# Patient Record
Sex: FEMALE | Race: WHITE | NOT HISPANIC OR LATINO | Employment: UNEMPLOYED | URBAN - METROPOLITAN AREA
[De-identification: names, ages, dates, MRNs, and addresses within clinical notes are randomized per-mention and may not be internally consistent; named-entity substitution may affect disease eponyms.]

---

## 2023-06-19 ENCOUNTER — APPOINTMENT (OUTPATIENT)
Dept: PRIMARY CARE | Facility: CLINIC | Age: 48
End: 2023-06-19
Payer: COMMERCIAL

## 2023-09-07 ENCOUNTER — OFFICE VISIT (OUTPATIENT)
Dept: PRIMARY CARE | Facility: CLINIC | Age: 48
End: 2023-09-07
Payer: COMMERCIAL

## 2023-09-07 VITALS
WEIGHT: 185 LBS | HEIGHT: 64 IN | DIASTOLIC BLOOD PRESSURE: 78 MMHG | SYSTOLIC BLOOD PRESSURE: 126 MMHG | BODY MASS INDEX: 31.58 KG/M2

## 2023-09-07 DIAGNOSIS — R35.0 URINARY FREQUENCY: ICD-10-CM

## 2023-09-07 PROCEDURE — 99213 OFFICE O/P EST LOW 20 MIN: CPT | Performed by: INTERNAL MEDICINE

## 2023-09-07 RX ORDER — NITROFURANTOIN 25; 75 MG/1; MG/1
100 CAPSULE ORAL 2 TIMES DAILY
Qty: 20 CAPSULE | Refills: 0 | Status: SHIPPED | OUTPATIENT
Start: 2023-09-07 | End: 2023-09-17

## 2023-09-07 ASSESSMENT — ENCOUNTER SYMPTOMS
DEPRESSION: 0
LOSS OF SENSATION IN FEET: 0
OCCASIONAL FEELINGS OF UNSTEADINESS: 0

## 2023-09-07 NOTE — PROGRESS NOTES
"OFFICE NOTE    NAME OF THE PATIENT: Liz Capone    YOB: 1975    CHIEF COMPLAINT:  Liz today came here for increased frequency, lower abdominal pressure, UTI symptoms.  She is going through change in life.  She wants to get tested.    PAST MEDICAL HISTORY:  Reviewed on EMR, unchanged.  Perimenopausal.    CURRENT MEDICATIONS:  Reviewed on EMR, unchanged.  Over-the-counter progesterone.    ALLERGIES:  Reviewed on EMR, unchanged.    SOCIAL HISTORY:  Reviewed on EMR, unchanged.  She does not smoke, does not drink alcohol.    FAMILY HISTORY:  Reviewed on EMR, unchanged.    REVIEW OF SYSTEMS:  All 12 systems reviewed and pertaining covered in history and physical.    PHYSICAL EXAMINATION  VITAL SIGNS:  As recorded and reviewed from EMR.  RESPIRATORY:  The patient had normal inspirations and expirations.  The breath sounds were equal bilaterally and clear to auscultation.  CARDIOVASCULAR:  The patient had S1 normal, split S2 without obvious rubs, clicks, or murmurs.    GASTROINTESTINAL:  There was no hepatosplenomegaly.  There were no palpable masses and no inguinal nodes.  EXTREMITIES:  Legs had no edema.  NEUROLOGIC:  The patient had normal cranial nerves.  The reflexes, sensory, and motor examination were grossly within normal limits.    LAB WORK:  UA ordered.    ASSESSMENT AND PLAN:  Increased frequency, UTI symptoms.  Macrobid given.  Perimenopausal.  We will check hormone level.  Weight gain ______.  She does not think she snores.  She does not think she needs sleep apnea test.  Blood work ordered.  I shall see her back in a week after the testing.  Continue to follow.    Kindly review this note in conjunction with EMR.   Subjective   Patient ID: Liz Capone is a 47 y.o. female who presents for Follow-up.      HPI    Review of Systems    Objective   /78   Ht 1.626 m (5' 4\")   Wt 83.9 kg (185 lb)   BMI 31.76 kg/m²       Physical Exam    Assessment/Plan   Problem List Items " Addressed This Visit    None

## 2023-09-08 ENCOUNTER — LAB (OUTPATIENT)
Dept: LAB | Facility: LAB | Age: 48
End: 2023-09-08
Payer: COMMERCIAL

## 2023-09-08 DIAGNOSIS — R35.0 URINARY FREQUENCY: ICD-10-CM

## 2023-09-08 LAB
APPEARANCE, URINE: CLEAR
BILIRUBIN, URINE: NEGATIVE
BLOOD, URINE: ABNORMAL
COLOR, URINE: ABNORMAL
GLUCOSE, URINE: NEGATIVE MG/DL
KETONES, URINE: NEGATIVE MG/DL
LEUKOCYTE ESTERASE, URINE: ABNORMAL
NITRITE, URINE: POSITIVE
PH, URINE: 6 (ref 5–8)
PROTEIN, URINE: NEGATIVE MG/DL
RBC, URINE: 2 /HPF (ref 0–5)
SPECIFIC GRAVITY, URINE: 1.02 (ref 1–1.03)
SQUAMOUS EPITHELIAL CELLS, URINE: 6 /HPF
UROBILINOGEN, URINE: 2 MG/DL (ref 0–1.9)
WBC, URINE: 6 /HPF (ref 0–5)

## 2023-09-08 PROCEDURE — 87086 URINE CULTURE/COLONY COUNT: CPT

## 2023-09-08 PROCEDURE — 81001 URINALYSIS AUTO W/SCOPE: CPT

## 2023-09-09 LAB — URINE CULTURE: NORMAL

## 2023-09-11 DIAGNOSIS — N39.0 URINARY TRACT INFECTION WITHOUT HEMATURIA, SITE UNSPECIFIED: ICD-10-CM

## 2023-09-11 DIAGNOSIS — R35.0 URINARY FREQUENCY: ICD-10-CM

## 2023-09-11 RX ORDER — SULFAMETHOXAZOLE AND TRIMETHOPRIM 800; 160 MG/1; MG/1
1 TABLET ORAL 2 TIMES DAILY
Qty: 20 TABLET | Refills: 0 | Status: SHIPPED | OUTPATIENT
Start: 2023-09-11 | End: 2023-09-21

## 2023-09-12 RX ORDER — CIPROFLOXACIN 500 MG/1
500 TABLET ORAL 2 TIMES DAILY
Qty: 20 TABLET | Refills: 0 | Status: SHIPPED | OUTPATIENT
Start: 2023-09-12 | End: 2023-09-22

## 2023-09-25 PROBLEM — R35.0 FREQUENCY OF MICTURITION: Status: ACTIVE | Noted: 2023-09-25

## 2023-09-25 PROBLEM — U07.1 COVID-19: Status: ACTIVE | Noted: 2020-12-29

## 2023-09-25 PROBLEM — N76.0 VULVOVAGINITIS: Status: ACTIVE | Noted: 2020-08-19

## 2023-09-25 PROBLEM — R30.0 BURNING WITH URINATION: Status: ACTIVE | Noted: 2021-06-15

## 2023-09-25 PROBLEM — R53.82 CHRONIC FATIGUE: Status: ACTIVE | Noted: 2023-09-25

## 2023-09-25 PROBLEM — R82.90 ABNORMAL URINE FINDING: Status: ACTIVE | Noted: 2019-04-25

## 2023-09-25 PROBLEM — M62.00 DIASTASIS OF MUSCLE: Status: ACTIVE | Noted: 2023-09-25

## 2023-09-25 PROBLEM — M54.50 LUMBAGO: Status: ACTIVE | Noted: 2023-09-25

## 2023-09-25 PROBLEM — R42 DIZZINESS: Status: ACTIVE | Noted: 2020-01-27

## 2023-09-25 PROBLEM — N92.6 IRREGULAR MENSES: Status: ACTIVE | Noted: 2021-04-22

## 2023-09-25 PROBLEM — F41.9 ANXIETY: Status: ACTIVE | Noted: 2023-09-25

## 2023-09-25 PROBLEM — R39.15 URINARY URGENCY: Status: ACTIVE | Noted: 2019-04-25

## 2023-09-25 PROBLEM — R20.2 PARESTHESIA OF SKIN: Status: ACTIVE | Noted: 2023-09-25

## 2023-09-25 PROBLEM — M54.2 CERVICALGIA: Status: ACTIVE | Noted: 2023-09-25

## 2023-09-25 PROBLEM — K21.9 ACID REFLUX: Status: ACTIVE | Noted: 2023-09-25

## 2023-09-25 PROBLEM — M54.30 SCIATIC NEURITIS: Status: ACTIVE | Noted: 2023-09-25

## 2023-09-25 PROBLEM — B37.31 VAGINITIS DUE TO CANDIDA ALBICANS: Status: ACTIVE | Noted: 2023-09-25

## 2023-09-25 PROBLEM — R79.89 LOW VITAMIN D LEVEL: Status: ACTIVE | Noted: 2021-04-22

## 2023-09-25 PROBLEM — N92.0 MENORRHAGIA WITH REGULAR CYCLE: Status: ACTIVE | Noted: 2023-09-25

## 2023-09-25 PROBLEM — H74.8X3: Status: ACTIVE | Noted: 2020-01-27

## 2023-09-25 PROBLEM — B35.4 TINEA CORPORIS: Status: ACTIVE | Noted: 2019-08-03

## 2023-09-25 RX ORDER — MUPIROCIN 20 MG/G
OINTMENT TOPICAL
COMMUNITY
Start: 2016-08-12

## 2023-09-25 RX ORDER — IBUPROFEN 800 MG/1
800 TABLET ORAL EVERY 8 HOURS PRN
COMMUNITY

## 2023-09-25 RX ORDER — ZINC GLUCONATE 50 MG
1 TABLET ORAL DAILY
COMMUNITY

## 2023-09-25 RX ORDER — LORATADINE PSEUDOEPHEDRINE SULFATE 10; 240 MG/1; MG/1
1 TABLET, EXTENDED RELEASE ORAL DAILY
COMMUNITY
Start: 2022-08-16

## 2023-09-25 RX ORDER — PANTOPRAZOLE SODIUM 40 MG/1
40 TABLET, DELAYED RELEASE ORAL DAILY
COMMUNITY
Start: 2022-02-15

## 2023-09-25 RX ORDER — ERGOCALCIFEROL (VITAMIN D2) 50 MCG
1 CAPSULE ORAL DAILY
COMMUNITY

## 2023-09-25 RX ORDER — FLUTICASONE PROPIONATE 50 MCG
SPRAY, SUSPENSION (ML) NASAL
COMMUNITY

## 2023-09-25 RX ORDER — HYDROCODONE BITARTRATE AND ACETAMINOPHEN 5; 325 MG/1; MG/1
1 TABLET ORAL EVERY 4 HOURS PRN
COMMUNITY

## 2023-10-03 ENCOUNTER — TELEPHONE (OUTPATIENT)
Dept: OBSTETRICS AND GYNECOLOGY | Facility: CLINIC | Age: 48
End: 2023-10-03
Payer: COMMERCIAL

## 2023-10-03 DIAGNOSIS — Z12.31 SCREENING MAMMOGRAM FOR BREAST CANCER: Primary | ICD-10-CM

## 2023-10-03 NOTE — TELEPHONE ENCOUNTER
Patient wants to know if she can have labs ordered before her appointment so that she can have her labs there done at the appointment,also if it can an annual exam that you can do everything at once instead of her coming back and forth,please advise

## 2023-10-06 ENCOUNTER — APPOINTMENT (OUTPATIENT)
Dept: OBSTETRICS AND GYNECOLOGY | Facility: CLINIC | Age: 48
End: 2023-10-06
Payer: COMMERCIAL

## 2023-12-28 DIAGNOSIS — R35.0 URINARY FREQUENCY: ICD-10-CM

## 2023-12-29 RX ORDER — CIPROFLOXACIN 500 MG/1
TABLET ORAL
Qty: 20 TABLET | Refills: 0 | OUTPATIENT
Start: 2023-12-29

## 2024-11-05 ENCOUNTER — APPOINTMENT (OUTPATIENT)
Dept: RADIOLOGY | Facility: CLINIC | Age: 49
End: 2024-11-05
Payer: COMMERCIAL

## 2024-12-09 ENCOUNTER — OFFICE VISIT (OUTPATIENT)
Dept: ORTHOPEDIC SURGERY | Facility: HOSPITAL | Age: 49
End: 2024-12-09
Payer: COMMERCIAL

## 2024-12-09 ENCOUNTER — HOSPITAL ENCOUNTER (OUTPATIENT)
Dept: RADIOLOGY | Facility: HOSPITAL | Age: 49
Discharge: HOME | End: 2024-12-09
Payer: COMMERCIAL

## 2024-12-09 DIAGNOSIS — S52.602A CLOSED FRACTURE OF LEFT DISTAL RADIUS AND ULNA, INITIAL ENCOUNTER: ICD-10-CM

## 2024-12-09 DIAGNOSIS — S52.502A CLOSED FRACTURE OF LEFT DISTAL RADIUS AND ULNA, INITIAL ENCOUNTER: ICD-10-CM

## 2024-12-09 DIAGNOSIS — M25.532 LEFT WRIST PAIN: Primary | ICD-10-CM

## 2024-12-09 DIAGNOSIS — M25.532 LEFT WRIST PAIN: ICD-10-CM

## 2024-12-09 PROCEDURE — 99203 OFFICE O/P NEW LOW 30 MIN: CPT | Performed by: STUDENT IN AN ORGANIZED HEALTH CARE EDUCATION/TRAINING PROGRAM

## 2024-12-09 PROCEDURE — 1036F TOBACCO NON-USER: CPT | Performed by: STUDENT IN AN ORGANIZED HEALTH CARE EDUCATION/TRAINING PROGRAM

## 2024-12-09 PROCEDURE — 99213 OFFICE O/P EST LOW 20 MIN: CPT | Performed by: STUDENT IN AN ORGANIZED HEALTH CARE EDUCATION/TRAINING PROGRAM

## 2024-12-09 RX ORDER — OXYCODONE AND ACETAMINOPHEN 5; 325 MG/1; MG/1
1 TABLET ORAL EVERY 6 HOURS PRN
Qty: 12 TABLET | Refills: 0 | Status: SHIPPED | OUTPATIENT
Start: 2024-12-09 | End: 2024-12-13

## 2024-12-09 ASSESSMENT — PAIN SCALES - GENERAL: PAINLEVEL_OUTOF10: 9

## 2024-12-09 ASSESSMENT — PAIN - FUNCTIONAL ASSESSMENT: PAIN_FUNCTIONAL_ASSESSMENT: 0-10

## 2024-12-09 NOTE — PROGRESS NOTES
History of Present Illness   Patient presents today for evaluation of side: left upper extremity pain.    The patient sustained an acute injury on  12/7/2024 .  The patient was celebrating her 49st birthday when she fell onto an outstretched hand.  She went and had immediate pain to her wrist and noticed swelling.  She went to an urgent care had x-rays performed which demonstrated a comminuted intra-articular left distal radius fracture.  She was given a splint.  The patient denies any loss of consciousness or additional significant injuries.  The patient denies any current numbness or tingling.  The pain is sharp, acute in nature, better with rest worse with motion.    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  She currently rates her pain 9 out of 10.     Past Medical History:   Diagnosis Date    Personal history of other diseases of the respiratory system 08/19/2013    Personal history of acute sinusitis       Medication Documentation Review Audit       Reviewed by Coty Richardson LPN (Licensed Nurse) on 12/09/24 at 1047      Medication Order Taking? Sig Documenting Provider Last Dose Status   DOCOSAHEXAENOIC ACID ORAL [id number]  Take 1 tablet by mouth once daily. Historical Provider, MD  Active   ergocalciferol (Vitamin D-2) 50 MCG (2000 UT) capsule capsule [id number]  Take 1 capsule (50 mcg) by mouth once daily. Historical Provider, MD  Active   fluticasone (Flonase) 50 mcg/actuation nasal spray [id number]  SPRAY 1 SPRAY INTO EACH NOSTRIL EVERY DAY FOR 30 DAYS Nasally Twice a day Historical Provider, MD  Active   HYDROcodone-acetaminophen (Norco) 5-325 mg tablet [id number]  Take 1 tablet by mouth every 4 hours if needed (pain). While awake Historical Provider, MD  Active   ibuprofen 800 mg tablet [id number]  Take 1 tablet (800 mg) by mouth every 8 hours if needed for mild pain (1 - 3). Historical Provider, MD  Active   loratadine-pseudoephedrine (Claritin-D 24 Hour)   mg 24 hr tablet [id number]  Take 1 tablet by mouth once daily. Historical Provider, MD  Active   mupirocin (Bactroban) 2 % ointment [id number]  APPLY A SMALL AMOUNT 3 TIMES DAILY AS DIRECTED. Historical Provider, MD  Active   pantoprazole (ProtoNix) 40 mg EC tablet [id number]  Take 1 tablet (40 mg) by mouth once daily. Historical Provider, MD  Active   zinc gluconate 50 mg tablet [id number]  Take 1 tablet (50 mg) by mouth once daily. Historical Provider, MD  Active                    Allergies   Allergen Reactions    Sulfamethoxazole-Trimethoprim Other    Amoxicillin-Pot Clavulanate Other    Levofloxacin Other    Sulfa (Sulfonamide Antibiotics) Chills       Social History     Socioeconomic History    Marital status:      Spouse name: Not on file    Number of children: Not on file    Years of education: Not on file    Highest education level: Not on file   Occupational History    Not on file   Tobacco Use    Smoking status: Never    Smokeless tobacco: Never   Substance and Sexual Activity    Alcohol use: Not on file    Drug use: Not on file    Sexual activity: Not on file   Other Topics Concern    Not on file   Social History Narrative    Not on file     Social Drivers of Health     Financial Resource Strain: Not on file   Food Insecurity: Not on file   Transportation Needs: Not on file   Physical Activity: Not on file   Stress: Not on file   Social Connections: Not on file   Intimate Partner Violence: Not on file   Housing Stability: Not on file       No past surgical history on file.       Review of Systems   GENERAL: Negative  GI: Negative  MUSCULOSKELETAL: See HPI  SKIN: Negative  NEURO:  Negative     Physical Exam:  side: left upper extremity:  Patient is in a volar and dorsal short arm Ortho-Glass splint with Coban present  Skin healthy to gross inspection, no breakdown  Significant swelling / ecchymosis noted  Tender to palpation to the distal radius.  Intact flexion and extension of 1st IP joint and  finger abduction  Sensation intact to light touch medial / ulnar and radial nerve distribution   Good cap refill     Imaging  Outside images were reviewed in office today.  These have been uploaded onto the patient's name.  XR of the left wrist dated 12/7/2024 demonstrate a comminuted intra-articular distal radius with metaphyseal extension with near anatomic alignment     Assessment   Patient with an acute side: left intra-articular distal radius fracture      Plan:  We discussed that she has a highly comminuted distal radius fracture with metaphyseal extension.  Even though she has a comminuted distal radius fracture her alignment is near anatomic.  At this point we will transition her into a plaster splint as her last splint was not providing her with enough support.  I am concerned that her alignment will not maintained in this position and therefore we will watch her closely.  I will see her back in 1 week with repeat x-rays.  If her alignment is maintained and her swelling significantly decreases then we will transition her to a cast.  If her fracture displaces then we will talk about surgical fixation.     Follow-up 1 week with repeat x-rays and splint

## 2024-12-13 DIAGNOSIS — S52.502A CLOSED FRACTURE OF LEFT DISTAL RADIUS AND ULNA, INITIAL ENCOUNTER: ICD-10-CM

## 2024-12-13 DIAGNOSIS — S52.602A CLOSED FRACTURE OF LEFT DISTAL RADIUS AND ULNA, INITIAL ENCOUNTER: ICD-10-CM

## 2024-12-13 RX ORDER — OXYCODONE AND ACETAMINOPHEN 5; 325 MG/1; MG/1
1 TABLET ORAL EVERY 6 HOURS PRN
Qty: 12 TABLET | Refills: 0 | Status: SHIPPED | OUTPATIENT
Start: 2024-12-13 | End: 2024-12-20

## 2024-12-16 ENCOUNTER — OFFICE VISIT (OUTPATIENT)
Dept: ORTHOPEDIC SURGERY | Facility: HOSPITAL | Age: 49
End: 2024-12-16
Payer: COMMERCIAL

## 2024-12-16 ENCOUNTER — HOSPITAL ENCOUNTER (OUTPATIENT)
Dept: RADIOLOGY | Facility: HOSPITAL | Age: 49
Discharge: HOME | End: 2024-12-16
Payer: COMMERCIAL

## 2024-12-16 DIAGNOSIS — M25.532 LEFT WRIST PAIN: Primary | ICD-10-CM

## 2024-12-16 DIAGNOSIS — M25.532 LEFT WRIST PAIN: ICD-10-CM

## 2024-12-16 PROCEDURE — 73110 X-RAY EXAM OF WRIST: CPT | Mod: LEFT SIDE | Performed by: RADIOLOGY

## 2024-12-16 PROCEDURE — 73110 X-RAY EXAM OF WRIST: CPT | Mod: LT

## 2024-12-16 PROCEDURE — 99212 OFFICE O/P EST SF 10 MIN: CPT | Performed by: STUDENT IN AN ORGANIZED HEALTH CARE EDUCATION/TRAINING PROGRAM

## 2024-12-16 ASSESSMENT — PAIN - FUNCTIONAL ASSESSMENT: PAIN_FUNCTIONAL_ASSESSMENT: 0-10

## 2024-12-16 ASSESSMENT — PAIN SCALES - GENERAL: PAINLEVEL_OUTOF10: 3

## 2024-12-16 NOTE — PROGRESS NOTES
History of Present Illness   Patient presents today for evaluation of side: left upper extremity pain.    The patient sustained an acute injury on  12/7/2024 .  The patient was celebrating her 49st birthday when she fell onto an outstretched hand.  She went and had immediate pain to her wrist and noticed swelling.  She went to an urgent care had x-rays performed which demonstrated a comminuted intra-articular left distal radius fracture.  She was given a splint.  The patient denies any loss of consciousness or additional significant injuries.  The patient denies any current numbness or tingling.  The pain is sharp, acute in nature, better with rest worse with motion.    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  She currently rates her pain 9 out of 10.    12/16/2024 follow-up:  Patient presents for follow-up.  She has remained in her splint since last visit.  She continues to have pain however it is improving.     Past Medical History:   Diagnosis Date    Personal history of other diseases of the respiratory system 08/19/2013    Personal history of acute sinusitis       Medication Documentation Review Audit       Reviewed by Coty Richardson LPN (Licensed Nurse) on 12/12/24 at 1324      Medication Order Taking? Sig Documenting Provider Last Dose Status   DOCOSAHEXAENOIC ACID ORAL [id number]  Take 1 tablet by mouth once daily. Historical Provider, MD  Active   ergocalciferol (Vitamin D-2) 50 MCG (2000 UT) capsule capsule [id number]  Take 1 capsule (50 mcg) by mouth once daily. Historical Provider, MD  Active   fluticasone (Flonase) 50 mcg/actuation nasal spray [id number]  SPRAY 1 SPRAY INTO EACH NOSTRIL EVERY DAY FOR 30 DAYS Nasally Twice a day Historical Provider, MD  Active   HYDROcodone-acetaminophen (Norco) 5-325 mg tablet [id number]  Take 1 tablet by mouth every 4 hours if needed (pain). While awake Historical Provider, MD  Active   ibuprofen 800 mg tablet [id number]   Take 1 tablet (800 mg) by mouth every 8 hours if needed for mild pain (1 - 3). Historical Provider, MD  Active   loratadine-pseudoephedrine (Claritin-D 24 Hour)  mg 24 hr tablet [id number]  Take 1 tablet by mouth once daily. Historical Provider, MD  Active   mupirocin (Bactroban) 2 % ointment [id number]  APPLY A SMALL AMOUNT 3 TIMES DAILY AS DIRECTED. Historical Provider, MD  Active   oxyCODONE-acetaminophen (Percocet) 5-325 mg tablet [id number]  Take 1 tablet by mouth every 6 hours if needed for severe pain (7 - 10) for up to 7 days. Will B Beucler, DO  Active   pantoprazole (ProtoNix) 40 mg EC tablet [id number]  Take 1 tablet (40 mg) by mouth once daily. Historical Provider, MD  Active   zinc gluconate 50 mg tablet [id number]  Take 1 tablet (50 mg) by mouth once daily. Historical Provider, MD  Active                    Allergies   Allergen Reactions    Sulfamethoxazole-Trimethoprim Other    Amoxicillin-Pot Clavulanate Other    Levofloxacin Other    Sulfa (Sulfonamide Antibiotics) Chills       Social History     Socioeconomic History    Marital status:      Spouse name: Not on file    Number of children: Not on file    Years of education: Not on file    Highest education level: Not on file   Occupational History    Not on file   Tobacco Use    Smoking status: Never    Smokeless tobacco: Never   Substance and Sexual Activity    Alcohol use: Not on file    Drug use: Not on file    Sexual activity: Not on file   Other Topics Concern    Not on file   Social History Narrative    Not on file     Social Drivers of Health     Financial Resource Strain: Not on file   Food Insecurity: Not on file   Transportation Needs: Not on file   Physical Activity: Not on file   Stress: Not on file   Social Connections: Not on file   Intimate Partner Violence: Not on file   Housing Stability: Not on file       No past surgical history on file.       Review of Systems   GENERAL: Negative  GI: Negative  MUSCULOSKELETAL: See  HPI  SKIN: Negative  NEURO:  Negative     Physical Exam:  side: left upper extremity:  Patient doing well fitting plaster splint.   Visible skin healthy to gross inspection, no breakdown  Moderate swelling / ecchymosis noted  Intact flexion and extension of 1st IP joint and finger abduction  Sensation intact to light touch medial / ulnar and radial nerve distribution   Good cap refill     Imaging  X-rays of the left wrist were taken and reviewed in office today.  These were compared to outside images were reviewed in office today.  These have been uploaded onto the patient's name.  XR of the left wrist dated 12/7/2024 demonstrate a comminuted intra-articular distal radius with metaphyseal extension with near anatomic alignment.  No significant change in alignment     Assessment   Patient with an acute side: left intra-articular distal radius fracture      Plan:  We discussed that she has a highly comminuted distal radius fracture with metaphyseal extension.  Even though she has a comminuted distal radius fracture her alignment is near anatomic.  We will keep her hand in her splint.  She will continue to elevate.  Will see her back in 1 week with repeat x-rays and transition to a short arm cast.  If her alignment holds at that point we can space out the appointments y.  I will see her back in 1 week with repeat x-rays.  If her fracture displaces then we will talk about surgical fixation.     Follow-up 1 week with repeat x-rays and splint

## 2024-12-23 ENCOUNTER — OFFICE VISIT (OUTPATIENT)
Dept: ORTHOPEDIC SURGERY | Facility: HOSPITAL | Age: 49
End: 2024-12-23
Payer: COMMERCIAL

## 2024-12-23 ENCOUNTER — HOSPITAL ENCOUNTER (OUTPATIENT)
Dept: RADIOLOGY | Facility: HOSPITAL | Age: 49
Discharge: HOME | End: 2024-12-23
Payer: COMMERCIAL

## 2024-12-23 DIAGNOSIS — M25.532 LEFT WRIST PAIN: Primary | ICD-10-CM

## 2024-12-23 DIAGNOSIS — M25.532 LEFT WRIST PAIN: ICD-10-CM

## 2024-12-23 PROCEDURE — 99213 OFFICE O/P EST LOW 20 MIN: CPT | Performed by: STUDENT IN AN ORGANIZED HEALTH CARE EDUCATION/TRAINING PROGRAM

## 2024-12-23 PROCEDURE — 73110 X-RAY EXAM OF WRIST: CPT | Mod: LT

## 2024-12-23 PROCEDURE — 73110 X-RAY EXAM OF WRIST: CPT | Mod: LEFT SIDE | Performed by: RADIOLOGY

## 2024-12-23 NOTE — PROGRESS NOTES
History of Present Illness   Patient presents today for evaluation of side: left upper extremity pain.    The patient sustained an acute injury on  12/7/2024 .  The patient was celebrating her 49st birthday when she fell onto an outstretched hand.  She went and had immediate pain to her wrist and noticed swelling.  She went to an urgent care had x-rays performed which demonstrated a comminuted intra-articular left distal radius fracture.  She was given a splint.  The patient denies any loss of consciousness or additional significant injuries.  The patient denies any current numbness or tingling.  The pain is sharp, acute in nature, better with rest worse with motion.    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  She currently rates her pain 9 out of 10.    12/16/2024 follow-up:  Patient presents for follow-up.  She has remained in her splint since last visit.  She continues to have pain however it is improving.    12/23/2024 follow-up:  Patient presents for follow-up.  She has remained in her splint.  She continues to follow-up for alignment check and possible transition to a cast.  She notes continued taking anti-inflammatory medication such as ibuprofen however her pain is lessening.  She was seen with her family present.     Past Medical History:   Diagnosis Date    Personal history of other diseases of the respiratory system 08/19/2013    Personal history of acute sinusitis       Medication Documentation Review Audit       Reviewed by Coty Richardson LPN (Licensed Nurse) on 12/16/24 at 0837      Medication Order Taking? Sig Documenting Provider Last Dose Status   DOCOSAHEXAENOIC ACID ORAL [id number]  Take 1 tablet by mouth once daily. Historical Provider, MD  Active   ergocalciferol (Vitamin D-2) 50 MCG (2000 UT) capsule capsule [id number]  Take 1 capsule (50 mcg) by mouth once daily. Historical Provider, MD  Active   fluticasone (Flonase) 50 mcg/actuation nasal spray  [id number]  SPRAY 1 SPRAY INTO EACH NOSTRIL EVERY DAY FOR 30 DAYS Nasally Twice a day Historical Provider, MD  Active   HYDROcodone-acetaminophen (Norco) 5-325 mg tablet [id number]  Take 1 tablet by mouth every 4 hours if needed (pain). While awake Historical Provider, MD  Active   ibuprofen 800 mg tablet [id number]  Take 1 tablet (800 mg) by mouth every 8 hours if needed for mild pain (1 - 3). Historical Provider, MD  Active   loratadine-pseudoephedrine (Claritin-D 24 Hour)  mg 24 hr tablet [id number]  Take 1 tablet by mouth once daily. Historical Provider, MD  Active   mupirocin (Bactroban) 2 % ointment [id number]  APPLY A SMALL AMOUNT 3 TIMES DAILY AS DIRECTED. Historical Provider, MD  Active     Discontinued 12/13/24 0856   oxyCODONE-acetaminophen (Percocet) 5-325 mg tablet [id number]  Take 1 tablet by mouth every 6 hours if needed for severe pain (7 - 10) for up to 7 days. Will B Beucler, DO  Active   pantoprazole (ProtoNix) 40 mg EC tablet [id number]  Take 1 tablet (40 mg) by mouth once daily. Historical Provider, MD  Active   zinc gluconate 50 mg tablet [id number]  Take 1 tablet (50 mg) by mouth once daily. Historical Provider, MD  Active                    Allergies   Allergen Reactions    Sulfamethoxazole-Trimethoprim Other    Amoxicillin-Pot Clavulanate Other    Levofloxacin Other    Sulfa (Sulfonamide Antibiotics) Chills       Social History     Socioeconomic History    Marital status:      Spouse name: Not on file    Number of children: Not on file    Years of education: Not on file    Highest education level: Not on file   Occupational History    Not on file   Tobacco Use    Smoking status: Never    Smokeless tobacco: Never   Substance and Sexual Activity    Alcohol use: Not on file    Drug use: Not on file    Sexual activity: Not on file   Other Topics Concern    Not on file   Social History Narrative    Not on file     Social Drivers of Health     Financial Resource Strain: Not on file   Food  Insecurity: Not on file   Transportation Needs: Not on file   Physical Activity: Not on file   Stress: Not on file   Social Connections: Not on file   Intimate Partner Violence: Not on file   Housing Stability: Not on file       No past surgical history on file.       Review of Systems   GENERAL: Negative  GI: Negative  MUSCULOSKELETAL: See HPI  SKIN: Negative  NEURO:  Negative     Physical Exam:  side: left upper extremity:  Well-padded short arm splint present.  This was removed  Skin healthy to gross inspection, no breakdown  Significant swelling / ecchymosis noted-improving  Tender to palpation to the distal radius.  Intact flexion and extension of 1st IP joint and finger abduction  Sensation intact to light touch medial / ulnar and radial nerve distribution   Good cap refill     Imaging  Outside images were taken and reviewed in office today dated 12/23/2024 and compared to previous x-rays   a comminuted intra-articular distal radius with metaphyseal extension with near anatomic alignment     Assessment   Patient with an acute side: left intra-articular distal radius fracture      Plan:  We discussed that she has a highly comminuted distal radius fracture with metaphyseal extension.  Even though she has a comminuted distal radius fracture her alignment is near anatomic.  At this point we will transition her into a short arm cast.  Her alignment has maintained.  At this point we will see her back in 3 weeks and take x-rays out of cast and likely transition to a removable splint.     Follow-up 3 weeks with x-rays out of cast

## 2025-01-14 ENCOUNTER — APPOINTMENT (OUTPATIENT)
Dept: ORTHOPEDIC SURGERY | Facility: HOSPITAL | Age: 50
End: 2025-01-14
Payer: COMMERCIAL

## 2025-01-14 ENCOUNTER — HOSPITAL ENCOUNTER (OUTPATIENT)
Dept: RADIOLOGY | Facility: HOSPITAL | Age: 50
Discharge: HOME | End: 2025-01-14
Payer: COMMERCIAL

## 2025-01-14 DIAGNOSIS — M25.532 LEFT WRIST PAIN: Primary | ICD-10-CM

## 2025-01-14 DIAGNOSIS — M25.532 LEFT WRIST PAIN: ICD-10-CM

## 2025-01-14 PROCEDURE — 99212 OFFICE O/P EST SF 10 MIN: CPT | Performed by: STUDENT IN AN ORGANIZED HEALTH CARE EDUCATION/TRAINING PROGRAM

## 2025-01-14 PROCEDURE — 73110 X-RAY EXAM OF WRIST: CPT | Mod: LT

## 2025-01-14 PROCEDURE — 1036F TOBACCO NON-USER: CPT | Performed by: STUDENT IN AN ORGANIZED HEALTH CARE EDUCATION/TRAINING PROGRAM

## 2025-01-14 PROCEDURE — 73110 X-RAY EXAM OF WRIST: CPT | Mod: LEFT SIDE | Performed by: RADIOLOGY

## 2025-01-14 ASSESSMENT — PAIN SCALES - GENERAL: PAINLEVEL_OUTOF10: 2

## 2025-01-14 ASSESSMENT — PAIN - FUNCTIONAL ASSESSMENT: PAIN_FUNCTIONAL_ASSESSMENT: 0-10

## 2025-01-14 NOTE — PROGRESS NOTES
History of Present Illness   Patient presents today for evaluation of side: left upper extremity pain.    The patient sustained an acute injury on  12/7/2024 .  The patient was celebrating her 49st birthday when she fell onto an outstretched hand.  She went and had immediate pain to her wrist and noticed swelling.  She went to an urgent care had x-rays performed which demonstrated a comminuted intra-articular left distal radius fracture.  She was given a splint.  The patient denies any loss of consciousness or additional significant injuries.  The patient denies any current numbness or tingling.  The pain is sharp, acute in nature, better with rest worse with motion.    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  She currently rates her pain 9 out of 10.    12/16/2024 follow-up:  Patient presents for follow-up.  She has remained in her splint since last visit.  She continues to have pain however it is improving.    12/23/2024 follow-up:  Patient presents for follow-up.  She has remained in her splint.  She continues to follow-up for alignment check and possible transition to a cast.  She notes continued taking anti-inflammatory medication such as ibuprofen however her   pain is lessening.  She was seen with her family present.      1/14/2025 follow-up:  Patient presents for follow-up today.  She has remained in her cast for approximately 5 and half weeks after sustaining a distal radius fracture.  She is been working on finger range of motion.  She is also been working on pronation and supination.  Her pain has lessened since her last follow-up.  She is increased her activity.  She is eager to get out of her cast.  She denies numbness and tingling.     Past Medical History:   Diagnosis Date    Personal history of other diseases of the respiratory system 08/19/2013    Personal history of acute sinusitis       Medication Documentation Review Audit       Reviewed by Coty SÁNCHEZ  Richardson, LPN (Licensed Nurse) on 01/14/25 at 0910      Medication Order Taking? Sig Documenting Provider Last Dose Status   DOCOSAHEXAENOIC ACID ORAL [id number]  Take 1 tablet by mouth once daily. Historical Provider, MD  Active   ergocalciferol (Vitamin D-2) 50 MCG (2000 UT) capsule capsule [id number]  Take 1 capsule (50 mcg) by mouth once daily. Historical Provider, MD  Active   fluticasone (Flonase) 50 mcg/actuation nasal spray [id number]  SPRAY 1 SPRAY INTO EACH NOSTRIL EVERY DAY FOR 30 DAYS Nasally Twice a day Historical Provider, MD  Active   HYDROcodone-acetaminophen (Norco) 5-325 mg tablet [id number]  Take 1 tablet by mouth every 4 hours if needed (pain). While awake Historical Provider, MD  Active   ibuprofen 800 mg tablet [id number]  Take 1 tablet (800 mg) by mouth every 8 hours if needed for mild pain (1 - 3). Historical Provider, MD  Active   loratadine-pseudoephedrine (Claritin-D 24 Hour)  mg 24 hr tablet [id number]  Take 1 tablet by mouth once daily. Historical Provider, MD  Active   mupirocin (Bactroban) 2 % ointment [id number]  APPLY A SMALL AMOUNT 3 TIMES DAILY AS DIRECTED. Historical Provider, MD  Active   pantoprazole (ProtoNix) 40 mg EC tablet [id number]  Take 1 tablet (40 mg) by mouth once daily. Historical Provider, MD  Active   zinc gluconate 50 mg tablet [id number]  Take 1 tablet (50 mg) by mouth once daily. Historical Provider, MD  Active                    Allergies   Allergen Reactions    Sulfamethoxazole-Trimethoprim Other    Amoxicillin-Pot Clavulanate Other    Levofloxacin Other    Sulfa (Sulfonamide Antibiotics) Chills       Social History     Socioeconomic History    Marital status:      Spouse name: Not on file    Number of children: Not on file    Years of education: Not on file    Highest education level: Not on file   Occupational History    Not on file   Tobacco Use    Smoking status: Never    Smokeless tobacco: Never   Substance and Sexual Activity    Alcohol use: Not on file     Drug use: Not on file    Sexual activity: Not on file   Other Topics Concern    Not on file   Social History Narrative    Not on file     Social Drivers of Health     Financial Resource Strain: Not on file   Food Insecurity: Not on file   Transportation Needs: Not on file   Physical Activity: Not on file   Stress: Not on file   Social Connections: Not on file   Intimate Partner Violence: Not on file   Housing Stability: Not on file       No past surgical history on file.       Review of Systems   GENERAL: Negative  GI: Negative  MUSCULOSKELETAL: See HPI  SKIN: Negative  NEURO:  Negative     Physical Exam:  side: left upper extremity:  Well-padded short arm cast present.  This was removed  Skin healthy to gross inspection, no breakdown  Mild swelling present  No significant ecchymosis noted  Nontender to palpation to the distal radius.  Patient can flex and extend the MP and IP joints.  She can make an composite fist the distal palmar crease.  Intact flexion and extension of 1st IP joint and finger abduction  Sensation intact to light touch medial / ulnar and radial nerve distribution   Good cap refill     Imaging  XR of the left wrist was taken reviewed in office today.  There is a minimally displaced distal radius fracture with overall alignment maintained.  There is callus formation present.     Assessment   Patient with an side: left intra-articular distal radius fracture      Plan:  We discussed that she has a highly comminuted distal radius fracture with metaphyseal extension.  Even though she has a comminuted distal radius fracture her alignment is near anatomic.  She has maintained immobilization for the last 5 and half weeks.  At this point we will transition her to a Velcro movable brace.  I did recommend her coming out of the brace for hygiene purposes and for gentle range of motion.  We discussed that I would like her to do gentle range of motion 3-4 times a day.  She was given wrist motion exercises  to do.  I did offer occupational therapy however she would like to do this on her own.  I have placed an order she would like to schedule OT.  She states she will think this over the next week.  She will follow-up in 4 to 6 weeks.  Follow-up for 6 weeks with repeat x-rays

## 2025-01-17 ENCOUNTER — APPOINTMENT (OUTPATIENT)
Dept: ORTHOPEDIC SURGERY | Facility: CLINIC | Age: 50
End: 2025-01-17
Payer: COMMERCIAL

## 2025-01-23 ENCOUNTER — EVALUATION (OUTPATIENT)
Facility: CLINIC | Age: 50
End: 2025-01-23
Payer: COMMERCIAL

## 2025-01-23 DIAGNOSIS — M25.532 LEFT WRIST PAIN: ICD-10-CM

## 2025-01-23 PROCEDURE — 97165 OT EVAL LOW COMPLEX 30 MIN: CPT | Mod: GO

## 2025-01-23 PROCEDURE — 97140 MANUAL THERAPY 1/> REGIONS: CPT | Mod: GO

## 2025-02-21 ENCOUNTER — HOSPITAL ENCOUNTER (OUTPATIENT)
Dept: RADIOLOGY | Facility: CLINIC | Age: 50
Discharge: HOME | End: 2025-02-21
Payer: COMMERCIAL

## 2025-02-21 ENCOUNTER — APPOINTMENT (OUTPATIENT)
Dept: RADIOLOGY | Facility: CLINIC | Age: 50
End: 2025-02-21
Payer: COMMERCIAL

## 2025-02-21 DIAGNOSIS — N39.0 URINARY TRACT INFECTION, SITE NOT SPECIFIED: ICD-10-CM

## 2025-02-21 DIAGNOSIS — E07.9 DISORDER OF THYROID, UNSPECIFIED: ICD-10-CM

## 2025-02-21 DIAGNOSIS — R22.1 LOCALIZED SWELLING, MASS AND LUMP, NECK: ICD-10-CM

## 2025-02-21 PROCEDURE — 76536 US EXAM OF HEAD AND NECK: CPT

## 2025-02-21 PROCEDURE — 76700 US EXAM ABDOM COMPLETE: CPT

## 2025-02-21 PROCEDURE — 76536 US EXAM OF HEAD AND NECK: CPT | Performed by: RADIOLOGY

## 2025-02-25 ENCOUNTER — APPOINTMENT (OUTPATIENT)
Dept: ORTHOPEDIC SURGERY | Facility: HOSPITAL | Age: 50
End: 2025-02-25
Payer: COMMERCIAL

## 2025-02-28 ENCOUNTER — HOSPITAL ENCOUNTER (OUTPATIENT)
Dept: RADIOLOGY | Facility: CLINIC | Age: 50
Discharge: HOME | End: 2025-02-28
Payer: COMMERCIAL

## 2025-02-28 ENCOUNTER — OFFICE VISIT (OUTPATIENT)
Dept: ORTHOPEDIC SURGERY | Facility: CLINIC | Age: 50
End: 2025-02-28
Payer: COMMERCIAL

## 2025-02-28 DIAGNOSIS — M25.532 LEFT WRIST PAIN: ICD-10-CM

## 2025-02-28 DIAGNOSIS — M25.532 LEFT WRIST PAIN: Primary | ICD-10-CM

## 2025-02-28 PROCEDURE — 1036F TOBACCO NON-USER: CPT | Performed by: STUDENT IN AN ORGANIZED HEALTH CARE EDUCATION/TRAINING PROGRAM

## 2025-02-28 PROCEDURE — 99213 OFFICE O/P EST LOW 20 MIN: CPT | Performed by: STUDENT IN AN ORGANIZED HEALTH CARE EDUCATION/TRAINING PROGRAM

## 2025-02-28 PROCEDURE — 73110 X-RAY EXAM OF WRIST: CPT | Mod: LT

## 2025-02-28 ASSESSMENT — PAIN - FUNCTIONAL ASSESSMENT: PAIN_FUNCTIONAL_ASSESSMENT: 0-10

## 2025-02-28 ASSESSMENT — PAIN SCALES - GENERAL: PAINLEVEL_OUTOF10: 4

## 2025-02-28 NOTE — PROGRESS NOTES
History of Present Illness   Patient presents today for evaluation of side: left upper extremity pain.    The patient sustained an acute injury on  12/7/2024 .  The patient was celebrating her 49st birthday when she fell onto an outstretched hand.  She went and had immediate pain to her wrist and noticed swelling.  She went to an urgent care had x-rays performed which demonstrated a comminuted intra-articular left distal radius fracture.  She was given a splint.  The patient denies any loss of consciousness or additional significant injuries.  The patient denies any current numbness or tingling.  The pain is sharp, acute in nature, better with rest worse with motion.    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  She currently rates her pain 9 out of 10.    12/16/2024 follow-up:  Patient presents for follow-up.  She has remained in her splint since last visit.  She continues to have pain however it is improving.    12/23/2024 follow-up:  Patient presents for follow-up.  She has remained in her splint.  She continues to follow-up for alignment check and possible transition to a cast.  She notes continued taking anti-inflammatory medication such as ibuprofen however her   pain is lessening.  She was seen with her family present.      1/14/2025 follow-up:  Patient presents for follow-up today.  She has remained in her cast for approximately 5 and half weeks after sustaining a distal radius fracture.  She is been working on finger range of motion.  She is also been working on pronation and supination.  Her pain has lessened since her last follow-up.  She is increased her activity.  She is eager to get out of her cast.  She denies numbness and tingling.    2/28/2025 follow-up:  Patient seen and evaluated in clinic today.  She has made significant improvement since her last visit.  She does rate her pain a 4 out of 10 however this is much improved.  She does admit some stiffness  and soreness to her wrist.  She did attend 1 occupational therapy session however she did not find value in it.  She denies numbness and tingling.  She has returned to most activities except for she does admit to pain with lifting heavy objects or pushing or pulling         Past Medical History:   Diagnosis Date    Personal history of other diseases of the respiratory system 08/19/2013    Personal history of acute sinusitis       Medication Documentation Review Audit       Reviewed by Coty Richardson LPN (Licensed Nurse) on 02/28/25 at 0938      Medication Order Taking? Sig Documenting Provider Last Dose Status   DOCOSAHEXAENOIC ACID ORAL [id number]  Take 1 tablet by mouth once daily. Historical Provider, MD  Active   ergocalciferol (Vitamin D-2) 50 MCG (2000 UT) capsule capsule [id number]  Take 1 capsule (50 mcg) by mouth once daily. Historical Provider, MD  Active   fluticasone (Flonase) 50 mcg/actuation nasal spray [id number]  SPRAY 1 SPRAY INTO EACH NOSTRIL EVERY DAY FOR 30 DAYS Nasally Twice a day Historical Provider, MD  Active   HYDROcodone-acetaminophen (Norco) 5-325 mg tablet [id number]  Take 1 tablet by mouth every 4 hours if needed (pain). While awake Historical Provider, MD  Active   ibuprofen 800 mg tablet [id number]  Take 1 tablet (800 mg) by mouth every 8 hours if needed for mild pain (1 - 3). Historical Provider, MD  Active   loratadine-pseudoephedrine (Claritin-D 24 Hour)  mg 24 hr tablet [id number]  Take 1 tablet by mouth once daily. Historical Provider, MD  Active   mupirocin (Bactroban) 2 % ointment [id number]  APPLY A SMALL AMOUNT 3 TIMES DAILY AS DIRECTED. Historical Provider, MD  Active   pantoprazole (ProtoNix) 40 mg EC tablet [id number]  Take 1 tablet (40 mg) by mouth once daily. Historical Provider, MD  Active   zinc gluconate 50 mg tablet [id number]  Take 1 tablet (50 mg) by mouth once daily. Historical Provider, MD  Active                    Allergies   Allergen Reactions     Sulfamethoxazole-Trimethoprim Other    Amoxicillin-Pot Clavulanate Other    Levofloxacin Other    Sulfa (Sulfonamide Antibiotics) Chills       Social History     Socioeconomic History    Marital status:      Spouse name: Not on file    Number of children: Not on file    Years of education: Not on file    Highest education level: Not on file   Occupational History    Not on file   Tobacco Use    Smoking status: Never    Smokeless tobacco: Never   Substance and Sexual Activity    Alcohol use: Not on file    Drug use: Not on file    Sexual activity: Not on file   Other Topics Concern    Not on file   Social History Narrative    Not on file     Social Drivers of Health     Financial Resource Strain: Not on file   Food Insecurity: Not on file   Transportation Needs: Not on file   Physical Activity: Not on file   Stress: Not on file   Social Connections: Not on file   Intimate Partner Violence: Not on file   Housing Stability: Not on file       No past surgical history on file.       Review of Systems   GENERAL: Negative  GI: Negative  MUSCULOSKELETAL: See HPI  SKIN: Negative  NEURO:  Negative     Physical Exam:  side: left upper extremity:  Well-padded short arm cast present.  This was removed  Skin healthy to gross inspection, no breakdown  Mild swelling present.  This is notable between the second webspace.  No ecchymosis noted  Nontender to palpation to the distal radius.  Patient can flex and extend the MP and IP joints.  She can make an composite fist the distal palmar crease.  Near normal pronation supination and wrist flexion.  She is lacking approximately 20 degrees of extension with compared to contralateral wrist.  Intact flexion and extension of 1st IP joint and finger abduction  Sensation intact to light touch medial / ulnar and radial nerve distribution   Good cap refill     Imaging  XR of the left wrist was taken reviewed in office today.  There is a minimally displaced distal radius fracture with  overall alignment maintained.  There is callus formation present.     Assessment   Patient with healed side: left intra-articular distal radius fracture      Plan:  The patient has made significant improvement in her outcomes.  She should continue to doing her home exercises and modalities.  We did discuss obtaining an Isotoner glove for edema control glove to wear at night.  We discussed that she can return to activities as tolerated.  She may be weightbearing as tolerated.  She may follow-up on an as-needed basis.

## 2025-03-28 ENCOUNTER — APPOINTMENT (OUTPATIENT)
Dept: ORTHOPEDIC SURGERY | Facility: CLINIC | Age: 50
End: 2025-03-28
Payer: COMMERCIAL

## 2025-06-25 ENCOUNTER — PATIENT OUTREACH (OUTPATIENT)
Dept: CARE COORDINATION | Facility: CLINIC | Age: 50
End: 2025-06-25
Payer: COMMERCIAL

## 2025-06-25 DIAGNOSIS — Z12.31 ENCOUNTER FOR SCREENING MAMMOGRAM FOR BREAST CANCER: ICD-10-CM

## 2025-07-18 ENCOUNTER — HOSPITAL ENCOUNTER (OUTPATIENT)
Dept: RADIOLOGY | Facility: CLINIC | Age: 50
Discharge: HOME | End: 2025-07-18
Payer: COMMERCIAL

## 2025-07-18 DIAGNOSIS — R10.31 RIGHT LOWER QUADRANT PAIN: ICD-10-CM

## 2025-07-18 PROCEDURE — 76705 ECHO EXAM OF ABDOMEN: CPT
